# Patient Record
Sex: FEMALE | Race: WHITE | NOT HISPANIC OR LATINO
[De-identification: names, ages, dates, MRNs, and addresses within clinical notes are randomized per-mention and may not be internally consistent; named-entity substitution may affect disease eponyms.]

---

## 2019-11-25 PROBLEM — Z00.00 ENCOUNTER FOR PREVENTIVE HEALTH EXAMINATION: Status: ACTIVE | Noted: 2019-11-25

## 2022-02-28 VITALS — WEIGHT: 158 LBS

## 2022-03-14 DIAGNOSIS — Z78.9 OTHER SPECIFIED HEALTH STATUS: ICD-10-CM

## 2022-03-14 DIAGNOSIS — Z87.898 PERSONAL HISTORY OF OTHER SPECIFIED CONDITIONS: ICD-10-CM

## 2022-03-14 DIAGNOSIS — R87.611 ATYPICAL SQUAMOUS CELLS CANNOT EXCLUDE HIGH GRADE SQUAMOUS INTRAEPITHELIAL LESION ON CYTOLOGIC SMEAR OF CERVIX (ASC-H): ICD-10-CM

## 2022-03-14 DIAGNOSIS — N92.5 OTHER SPECIFIED IRREGULAR MENSTRUATION: ICD-10-CM

## 2022-03-14 DIAGNOSIS — Z80.3 FAMILY HISTORY OF MALIGNANT NEOPLASM OF BREAST: ICD-10-CM

## 2022-03-14 DIAGNOSIS — Z82.49 FAMILY HISTORY OF ISCHEMIC HEART DISEASE AND OTHER DISEASES OF THE CIRCULATORY SYSTEM: ICD-10-CM

## 2022-03-14 PROBLEM — Z00.00 ENCOUNTER FOR PREVENTIVE HEALTH EXAMINATION: Status: ACTIVE | Noted: 2022-03-14

## 2022-03-21 ENCOUNTER — APPOINTMENT (OUTPATIENT)
Dept: OBGYN | Facility: CLINIC | Age: 37
End: 2022-03-21
Payer: COMMERCIAL

## 2022-03-21 VITALS
OXYGEN SATURATION: 98 % | BODY MASS INDEX: 24.85 KG/M2 | HEART RATE: 90 BPM | DIASTOLIC BLOOD PRESSURE: 69 MMHG | TEMPERATURE: 98.2 F | RESPIRATION RATE: 16 BRPM | WEIGHT: 158.31 LBS | HEIGHT: 67 IN | SYSTOLIC BLOOD PRESSURE: 109 MMHG

## 2022-03-21 PROCEDURE — 0502F SUBSEQUENT PRENATAL CARE: CPT

## 2022-04-14 ENCOUNTER — APPOINTMENT (OUTPATIENT)
Dept: ANTEPARTUM | Facility: CLINIC | Age: 37
End: 2022-04-14
Payer: COMMERCIAL

## 2022-04-14 ENCOUNTER — ASOB RESULT (OUTPATIENT)
Age: 37
End: 2022-04-14

## 2022-04-14 PROCEDURE — 76811 OB US DETAILED SNGL FETUS: CPT

## 2022-04-18 ENCOUNTER — APPOINTMENT (OUTPATIENT)
Dept: OBGYN | Facility: CLINIC | Age: 37
End: 2022-04-18
Payer: COMMERCIAL

## 2022-04-18 ENCOUNTER — TRANSCRIPTION ENCOUNTER (OUTPATIENT)
Age: 37
End: 2022-04-18

## 2022-04-18 ENCOUNTER — RESULT CHARGE (OUTPATIENT)
Age: 37
End: 2022-04-18

## 2022-04-18 VITALS
BODY MASS INDEX: 25.11 KG/M2 | TEMPERATURE: 97.7 F | SYSTOLIC BLOOD PRESSURE: 116 MMHG | HEART RATE: 94 BPM | RESPIRATION RATE: 16 BRPM | DIASTOLIC BLOOD PRESSURE: 72 MMHG | OXYGEN SATURATION: 98 % | HEIGHT: 67 IN | WEIGHT: 160 LBS

## 2022-04-18 PROCEDURE — 0502F SUBSEQUENT PRENATAL CARE: CPT

## 2022-05-16 ENCOUNTER — RESULT CHARGE (OUTPATIENT)
Age: 37
End: 2022-05-16

## 2022-05-16 ENCOUNTER — APPOINTMENT (OUTPATIENT)
Dept: OBGYN | Facility: CLINIC | Age: 37
End: 2022-05-16
Payer: COMMERCIAL

## 2022-05-16 VITALS
DIASTOLIC BLOOD PRESSURE: 70 MMHG | RESPIRATION RATE: 16 BRPM | OXYGEN SATURATION: 97 % | TEMPERATURE: 98.3 F | HEIGHT: 67 IN | SYSTOLIC BLOOD PRESSURE: 108 MMHG | WEIGHT: 170 LBS | HEART RATE: 97 BPM | BODY MASS INDEX: 26.68 KG/M2

## 2022-05-16 PROCEDURE — 0502F SUBSEQUENT PRENATAL CARE: CPT

## 2022-06-08 ENCOUNTER — APPOINTMENT (OUTPATIENT)
Dept: OBGYN | Facility: CLINIC | Age: 37
End: 2022-06-08
Payer: COMMERCIAL

## 2022-06-08 ENCOUNTER — LABORATORY RESULT (OUTPATIENT)
Age: 37
End: 2022-06-08

## 2022-06-08 VITALS
RESPIRATION RATE: 17 BRPM | OXYGEN SATURATION: 96 % | BODY MASS INDEX: 27 KG/M2 | SYSTOLIC BLOOD PRESSURE: 108 MMHG | TEMPERATURE: 97.5 F | HEIGHT: 67 IN | HEART RATE: 106 BPM | WEIGHT: 172 LBS | DIASTOLIC BLOOD PRESSURE: 70 MMHG

## 2022-06-08 PROCEDURE — 36415 COLL VENOUS BLD VENIPUNCTURE: CPT

## 2022-06-08 PROCEDURE — 0502F SUBSEQUENT PRENATAL CARE: CPT

## 2022-06-13 ENCOUNTER — NON-APPOINTMENT (OUTPATIENT)
Age: 37
End: 2022-06-13

## 2022-06-13 LAB
APPEARANCE: CLEAR
BASOPHILS # BLD AUTO: 0.02 K/UL
BASOPHILS NFR BLD AUTO: 0.3 %
BILIRUBIN URINE: NEGATIVE
BLOOD URINE: NEGATIVE
COLOR: COLORLESS
EOSINOPHIL # BLD AUTO: 0.08 K/UL
EOSINOPHIL NFR BLD AUTO: 1 %
GLUCOSE 1H P 50 G GLC PO SERPL-MCNC: 134 MG/DL
GLUCOSE QUALITATIVE U: ABNORMAL
HCT VFR BLD CALC: 37.7 %
HGB BLD-MCNC: 11.8 G/DL
IMM GRANULOCYTES NFR BLD AUTO: 1.2 %
KETONES URINE: NEGATIVE
LEUKOCYTE ESTERASE URINE: ABNORMAL
LYMPHOCYTES # BLD AUTO: 1.36 K/UL
LYMPHOCYTES NFR BLD AUTO: 17.7 %
MAN DIFF?: NORMAL
MCHC RBC-ENTMCNC: 31.3 GM/DL
MCHC RBC-ENTMCNC: 31.6 PG
MCV RBC AUTO: 100.8 FL
MONOCYTES # BLD AUTO: 0.48 K/UL
MONOCYTES NFR BLD AUTO: 6.2 %
NEUTROPHILS # BLD AUTO: 5.66 K/UL
NEUTROPHILS NFR BLD AUTO: 73.6 %
NITRITE URINE: NEGATIVE
PH URINE: 6.5
PLATELET # BLD AUTO: 269 K/UL
PROTEIN URINE: NEGATIVE
RBC # BLD: 3.74 M/UL
RBC # FLD: 14.7 %
SPECIFIC GRAVITY URINE: 1
UROBILINOGEN URINE: NORMAL
WBC # FLD AUTO: 7.69 K/UL

## 2022-06-15 RX ORDER — NITROFURANTOIN (MONOHYDRATE/MACROCRYSTALS) 25; 75 MG/1; MG/1
100 CAPSULE ORAL
Qty: 14 | Refills: 0 | Status: ACTIVE | COMMUNITY
Start: 2022-06-15 | End: 1900-01-01

## 2022-06-27 ENCOUNTER — ASOB RESULT (OUTPATIENT)
Age: 37
End: 2022-06-27

## 2022-06-27 ENCOUNTER — APPOINTMENT (OUTPATIENT)
Dept: OBGYN | Facility: CLINIC | Age: 37
End: 2022-06-27
Payer: COMMERCIAL

## 2022-06-27 VITALS
DIASTOLIC BLOOD PRESSURE: 66 MMHG | RESPIRATION RATE: 16 BRPM | HEIGHT: 67 IN | BODY MASS INDEX: 27.47 KG/M2 | TEMPERATURE: 97.2 F | WEIGHT: 175 LBS | SYSTOLIC BLOOD PRESSURE: 102 MMHG | OXYGEN SATURATION: 96 % | HEART RATE: 94 BPM

## 2022-06-27 PROCEDURE — 0502F SUBSEQUENT PRENATAL CARE: CPT

## 2022-06-27 PROCEDURE — 76819 FETAL BIOPHYS PROFIL W/O NST: CPT

## 2022-06-27 PROCEDURE — 76805 OB US >/= 14 WKS SNGL FETUS: CPT

## 2022-06-28 LAB — GLUCOSE 1H P 100 G GLC PO SERPL-MCNC: 170 MG/DL

## 2022-07-08 ENCOUNTER — APPOINTMENT (OUTPATIENT)
Dept: OBGYN | Facility: CLINIC | Age: 37
End: 2022-07-08

## 2022-07-08 VITALS
HEIGHT: 67 IN | OXYGEN SATURATION: 99 % | HEART RATE: 102 BPM | RESPIRATION RATE: 17 BRPM | WEIGHT: 176 LBS | BODY MASS INDEX: 27.62 KG/M2 | DIASTOLIC BLOOD PRESSURE: 74 MMHG | TEMPERATURE: 98.2 F | SYSTOLIC BLOOD PRESSURE: 112 MMHG

## 2022-07-08 LAB
BILIRUB UR QL STRIP: NEGATIVE
CLARITY UR: CLEAR
COLLECTION METHOD: NORMAL
GLUCOSE UR-MCNC: NEGATIVE
HCG UR QL: 0.2 EU/DL
HGB UR QL STRIP.AUTO: NORMAL
KETONES UR-MCNC: NEGATIVE
LEUKOCYTE ESTERASE UR QL STRIP: NORMAL
NITRITE UR QL STRIP: NEGATIVE
PH UR STRIP: 7
PROT UR STRIP-MCNC: NEGATIVE
SP GR UR STRIP: 1.01

## 2022-07-08 PROCEDURE — 0502F SUBSEQUENT PRENATAL CARE: CPT

## 2022-07-08 PROCEDURE — 81002 URINALYSIS NONAUTO W/O SCOPE: CPT

## 2022-07-29 ENCOUNTER — APPOINTMENT (OUTPATIENT)
Dept: OBGYN | Facility: CLINIC | Age: 37
End: 2022-07-29

## 2022-07-29 ENCOUNTER — ASOB RESULT (OUTPATIENT)
Age: 37
End: 2022-07-29

## 2022-07-29 VITALS
RESPIRATION RATE: 17 BRPM | WEIGHT: 180 LBS | OXYGEN SATURATION: 98 % | TEMPERATURE: 97.9 F | DIASTOLIC BLOOD PRESSURE: 72 MMHG | SYSTOLIC BLOOD PRESSURE: 110 MMHG | BODY MASS INDEX: 28.25 KG/M2 | HEIGHT: 67 IN | HEART RATE: 105 BPM

## 2022-07-29 LAB
BILIRUB UR QL STRIP: NEGATIVE
CLARITY UR: CLEAR
COLLECTION METHOD: NORMAL
GLUCOSE UR-MCNC: NEGATIVE
HCG UR QL: 0.2 EU/DL
HGB UR QL STRIP.AUTO: NEGATIVE
KETONES UR-MCNC: NEGATIVE
LEUKOCYTE ESTERASE UR QL STRIP: NORMAL
NITRITE UR QL STRIP: NEGATIVE
PH UR STRIP: 7
PROT UR STRIP-MCNC: NORMAL
SP GR UR STRIP: 1.02

## 2022-07-29 PROCEDURE — 76805 OB US >/= 14 WKS SNGL FETUS: CPT

## 2022-07-29 PROCEDURE — 0502F SUBSEQUENT PRENATAL CARE: CPT

## 2022-07-29 PROCEDURE — 76818 FETAL BIOPHYS PROFILE W/NST: CPT

## 2022-08-01 LAB
GP B STREP DNA SPEC QL NAA+PROBE: NORMAL
GP B STREP DNA SPEC QL NAA+PROBE: NOT DETECTED
SOURCE GBS: NORMAL

## 2022-08-03 ENCOUNTER — NON-APPOINTMENT (OUTPATIENT)
Age: 37
End: 2022-08-03

## 2022-08-04 ENCOUNTER — ASOB RESULT (OUTPATIENT)
Age: 37
End: 2022-08-04

## 2022-08-04 ENCOUNTER — APPOINTMENT (OUTPATIENT)
Dept: OBGYN | Facility: CLINIC | Age: 37
End: 2022-08-04

## 2022-08-04 VITALS
DIASTOLIC BLOOD PRESSURE: 74 MMHG | BODY MASS INDEX: 28.09 KG/M2 | TEMPERATURE: 98.4 F | WEIGHT: 179 LBS | SYSTOLIC BLOOD PRESSURE: 116 MMHG | OXYGEN SATURATION: 95 % | HEART RATE: 89 BPM | RESPIRATION RATE: 17 BRPM | HEIGHT: 67 IN

## 2022-08-04 DIAGNOSIS — Z34.90 ENCOUNTER FOR SUPERVISION OF NORMAL PREGNANCY, UNSPECIFIED, UNSPECIFIED TRIMESTER: ICD-10-CM

## 2022-08-04 LAB
BILIRUB UR QL STRIP: NEGATIVE
CLARITY UR: CLEAR
COLLECTION METHOD: NORMAL
GLUCOSE UR-MCNC: NEGATIVE
HCG UR QL: 0.2 EU/DL
HGB UR QL STRIP.AUTO: NORMAL
KETONES UR-MCNC: NEGATIVE
LEUKOCYTE ESTERASE UR QL STRIP: NORMAL
NITRITE UR QL STRIP: NEGATIVE
PH UR STRIP: 6
PROT UR STRIP-MCNC: NEGATIVE
SP GR UR STRIP: 1.01

## 2022-08-04 PROCEDURE — 76818 FETAL BIOPHYS PROFILE W/NST: CPT

## 2022-08-04 PROCEDURE — 99213 OFFICE O/P EST LOW 20 MIN: CPT | Mod: 25

## 2022-08-04 PROCEDURE — 0502F SUBSEQUENT PRENATAL CARE: CPT

## 2022-08-04 PROCEDURE — 81002 URINALYSIS NONAUTO W/O SCOPE: CPT

## 2022-08-11 ENCOUNTER — TRANSCRIPTION ENCOUNTER (OUTPATIENT)
Age: 37
End: 2022-08-11

## 2022-08-11 ENCOUNTER — INPATIENT (INPATIENT)
Facility: HOSPITAL | Age: 37
LOS: 0 days | Discharge: HOME | End: 2022-08-12
Attending: OBSTETRICS & GYNECOLOGY | Admitting: OBSTETRICS & GYNECOLOGY

## 2022-08-11 VITALS — TEMPERATURE: 98 F

## 2022-08-11 DIAGNOSIS — O42.10 PREMATURE RUPTURE OF MEMBRANES, ONSET OF LABOR MORE THAN 24 HOURS FOLLOWING RUPTURE, UNSPECIFIED WEEKS OF GESTATION: ICD-10-CM

## 2022-08-11 DIAGNOSIS — O26.899 OTHER SPECIFIED PREGNANCY RELATED CONDITIONS, UNSPECIFIED TRIMESTER: ICD-10-CM

## 2022-08-11 DIAGNOSIS — Z90.79 ACQUIRED ABSENCE OF OTHER GENITAL ORGAN(S): Chronic | ICD-10-CM

## 2022-08-11 DIAGNOSIS — Z3A.00 WEEKS OF GESTATION OF PREGNANCY NOT SPECIFIED: ICD-10-CM

## 2022-08-11 LAB
BASOPHILS # BLD AUTO: 0.03 K/UL — SIGNIFICANT CHANGE UP (ref 0–0.2)
BASOPHILS NFR BLD AUTO: 0.2 % — SIGNIFICANT CHANGE UP (ref 0–2)
COVID-19 SPIKE DOMAIN AB INTERP: POSITIVE
COVID-19 SPIKE DOMAIN ANTIBODY RESULT: >250 U/ML — HIGH
EOSINOPHIL # BLD AUTO: 0.07 K/UL — SIGNIFICANT CHANGE UP (ref 0–0.5)
EOSINOPHIL NFR BLD AUTO: 0.6 % — SIGNIFICANT CHANGE UP (ref 0–6)
HCT VFR BLD CALC: 36.9 % — SIGNIFICANT CHANGE UP (ref 34.5–45)
HGB BLD-MCNC: 12.5 G/DL — SIGNIFICANT CHANGE UP (ref 11.5–15.5)
IANC: 9.15 K/UL — HIGH (ref 1.8–7.4)
IMM GRANULOCYTES NFR BLD AUTO: 1.5 % — SIGNIFICANT CHANGE UP (ref 0–1.5)
LYMPHOCYTES # BLD AUTO: 1.86 K/UL — SIGNIFICANT CHANGE UP (ref 1–3.3)
LYMPHOCYTES # BLD AUTO: 15.3 % — SIGNIFICANT CHANGE UP (ref 13–44)
MCHC RBC-ENTMCNC: 31.5 PG — SIGNIFICANT CHANGE UP (ref 27–34)
MCHC RBC-ENTMCNC: 33.9 GM/DL — SIGNIFICANT CHANGE UP (ref 32–36)
MCV RBC AUTO: 92.9 FL — SIGNIFICANT CHANGE UP (ref 80–100)
MONOCYTES # BLD AUTO: 0.84 K/UL — SIGNIFICANT CHANGE UP (ref 0–0.9)
MONOCYTES NFR BLD AUTO: 6.9 % — SIGNIFICANT CHANGE UP (ref 2–14)
NEUTROPHILS # BLD AUTO: 9.15 K/UL — HIGH (ref 1.8–7.4)
NEUTROPHILS NFR BLD AUTO: 75.5 % — SIGNIFICANT CHANGE UP (ref 43–77)
NRBC # BLD: 0 /100 WBCS — SIGNIFICANT CHANGE UP
NRBC # FLD: 0 K/UL — SIGNIFICANT CHANGE UP
PLATELET # BLD AUTO: 266 K/UL — SIGNIFICANT CHANGE UP (ref 150–400)
RBC # BLD: 3.97 M/UL — SIGNIFICANT CHANGE UP (ref 3.8–5.2)
RBC # FLD: 14.6 % — HIGH (ref 10.3–14.5)
SARS-COV-2 IGG+IGM SERPL QL IA: >250 U/ML — HIGH
SARS-COV-2 IGG+IGM SERPL QL IA: POSITIVE
SARS-COV-2 RNA SPEC QL NAA+PROBE: SIGNIFICANT CHANGE UP
T PALLIDUM AB TITR SER: NEGATIVE — SIGNIFICANT CHANGE UP
WBC # BLD: 12.13 K/UL — HIGH (ref 3.8–10.5)
WBC # FLD AUTO: 12.13 K/UL — HIGH (ref 3.8–10.5)

## 2022-08-11 PROCEDURE — 59400 OBSTETRICAL CARE: CPT | Mod: U7,UB,GC

## 2022-08-11 RX ORDER — PRAMOXINE HYDROCHLORIDE 150 MG/15G
1 AEROSOL, FOAM RECTAL EVERY 4 HOURS
Refills: 0 | Status: DISCONTINUED | OUTPATIENT
Start: 2022-08-11 | End: 2022-08-12

## 2022-08-11 RX ORDER — SODIUM CHLORIDE 9 MG/ML
1000 INJECTION, SOLUTION INTRAVENOUS
Refills: 0 | Status: DISCONTINUED | OUTPATIENT
Start: 2022-08-11 | End: 2022-08-11

## 2022-08-11 RX ORDER — AER TRAVELER 0.5 G/1
1 SOLUTION RECTAL; TOPICAL EVERY 4 HOURS
Refills: 0 | Status: DISCONTINUED | OUTPATIENT
Start: 2022-08-11 | End: 2022-08-12

## 2022-08-11 RX ORDER — ACETAMINOPHEN 500 MG
975 TABLET ORAL
Refills: 0 | Status: DISCONTINUED | OUTPATIENT
Start: 2022-08-11 | End: 2022-08-12

## 2022-08-11 RX ORDER — LANOLIN
1 OINTMENT (GRAM) TOPICAL EVERY 6 HOURS
Refills: 0 | Status: DISCONTINUED | OUTPATIENT
Start: 2022-08-11 | End: 2022-08-12

## 2022-08-11 RX ORDER — IBUPROFEN 200 MG
600 TABLET ORAL EVERY 6 HOURS
Refills: 0 | Status: COMPLETED | OUTPATIENT
Start: 2022-08-11 | End: 2023-07-10

## 2022-08-11 RX ORDER — BENZOCAINE 10 %
1 GEL (GRAM) MUCOUS MEMBRANE EVERY 6 HOURS
Refills: 0 | Status: DISCONTINUED | OUTPATIENT
Start: 2022-08-11 | End: 2022-08-12

## 2022-08-11 RX ORDER — SODIUM CHLORIDE 9 MG/ML
3 INJECTION INTRAMUSCULAR; INTRAVENOUS; SUBCUTANEOUS EVERY 8 HOURS
Refills: 0 | Status: DISCONTINUED | OUTPATIENT
Start: 2022-08-11 | End: 2022-08-12

## 2022-08-11 RX ORDER — MAGNESIUM HYDROXIDE 400 MG/1
30 TABLET, CHEWABLE ORAL
Refills: 0 | Status: DISCONTINUED | OUTPATIENT
Start: 2022-08-11 | End: 2022-08-12

## 2022-08-11 RX ORDER — OXYTOCIN 10 UNIT/ML
333.33 VIAL (ML) INJECTION
Qty: 20 | Refills: 0 | Status: DISCONTINUED | OUTPATIENT
Start: 2022-08-11 | End: 2022-08-12

## 2022-08-11 RX ORDER — HYDROCORTISONE 1 %
1 OINTMENT (GRAM) TOPICAL EVERY 6 HOURS
Refills: 0 | Status: DISCONTINUED | OUTPATIENT
Start: 2022-08-11 | End: 2022-08-12

## 2022-08-11 RX ORDER — TETANUS TOXOID, REDUCED DIPHTHERIA TOXOID AND ACELLULAR PERTUSSIS VACCINE, ADSORBED 5; 2.5; 8; 8; 2.5 [IU]/.5ML; [IU]/.5ML; UG/.5ML; UG/.5ML; UG/.5ML
0.5 SUSPENSION INTRAMUSCULAR ONCE
Refills: 0 | Status: COMPLETED | OUTPATIENT
Start: 2022-08-11

## 2022-08-11 RX ORDER — DIBUCAINE 1 %
1 OINTMENT (GRAM) RECTAL EVERY 6 HOURS
Refills: 0 | Status: DISCONTINUED | OUTPATIENT
Start: 2022-08-11 | End: 2022-08-12

## 2022-08-11 RX ORDER — SIMETHICONE 80 MG/1
80 TABLET, CHEWABLE ORAL EVERY 4 HOURS
Refills: 0 | Status: DISCONTINUED | OUTPATIENT
Start: 2022-08-11 | End: 2022-08-12

## 2022-08-11 RX ORDER — OXYCODONE HYDROCHLORIDE 5 MG/1
5 TABLET ORAL ONCE
Refills: 0 | Status: DISCONTINUED | OUTPATIENT
Start: 2022-08-11 | End: 2022-08-12

## 2022-08-11 RX ORDER — KETOROLAC TROMETHAMINE 30 MG/ML
30 SYRINGE (ML) INJECTION ONCE
Refills: 0 | Status: DISCONTINUED | OUTPATIENT
Start: 2022-08-11 | End: 2022-08-12

## 2022-08-11 RX ORDER — CITRIC ACID/SODIUM CITRATE 300-500 MG
15 SOLUTION, ORAL ORAL EVERY 6 HOURS
Refills: 0 | Status: DISCONTINUED | OUTPATIENT
Start: 2022-08-11 | End: 2022-08-11

## 2022-08-11 RX ORDER — CHLORHEXIDINE GLUCONATE 213 G/1000ML
1 SOLUTION TOPICAL ONCE
Refills: 0 | Status: DISCONTINUED | OUTPATIENT
Start: 2022-08-11 | End: 2022-08-11

## 2022-08-11 RX ORDER — DIPHENHYDRAMINE HCL 50 MG
25 CAPSULE ORAL EVERY 6 HOURS
Refills: 0 | Status: DISCONTINUED | OUTPATIENT
Start: 2022-08-11 | End: 2022-08-12

## 2022-08-11 RX ORDER — OXYCODONE HYDROCHLORIDE 5 MG/1
5 TABLET ORAL
Refills: 0 | Status: DISCONTINUED | OUTPATIENT
Start: 2022-08-11 | End: 2022-08-12

## 2022-08-11 RX ADMIN — SODIUM CHLORIDE 3 MILLILITER(S): 9 INJECTION INTRAMUSCULAR; INTRAVENOUS; SUBCUTANEOUS at 22:00

## 2022-08-11 RX ADMIN — Medication 1000 MILLIUNIT(S)/MIN: at 10:14

## 2022-08-11 RX ADMIN — Medication 975 MILLIGRAM(S): at 21:50

## 2022-08-11 RX ADMIN — SODIUM CHLORIDE 3 MILLILITER(S): 9 INJECTION INTRAMUSCULAR; INTRAVENOUS; SUBCUTANEOUS at 14:00

## 2022-08-11 RX ADMIN — SODIUM CHLORIDE 125 MILLILITER(S): 9 INJECTION, SOLUTION INTRAVENOUS at 05:31

## 2022-08-11 RX ADMIN — Medication 975 MILLIGRAM(S): at 20:59

## 2022-08-11 NOTE — OB PROVIDER LABOR PROGRESS NOTE - ASSESSMENT
35yo  @38w4d PROM@2am  - Start Pitocin when covering physician arrives    Isabell Lafleur, PGY1  d/w Dr. Lam
 @ 38w4d IOL for PROM, current category II tracing making cervical change.    -Repositioned to left lateral side  -Oxygen placed via nonrebreather  -Recovery in FHR tracing in noted  -Dr. Lam notified and is en route to [facility]

## 2022-08-11 NOTE — OB PROVIDER H&P - HISTORY OF PRESENT ILLNESS
35y/o  @38.4wks presents with leaking of clear fluid since 2am and mild contractions.   Reports good fetal movement  Denies VB    Allergies: Denies  Medications: PNV, ASA    Medical HX: Denies  Surgical HX: Left Salpingectomy   Denies Etoh/Psy/Drugs/Smoke

## 2022-08-11 NOTE — OB PROVIDER LABOR PROGRESS NOTE - NS_OBIHIFHRDETAILS_OBGYN_ALL_OB_FT
baseline 145, moderate variability, accels present, intermittent variables
140bpm, moderate variability, no accels, variable decels

## 2022-08-11 NOTE — OB NEONATOLOGY/PEDIATRICIAN DELIVERY SUMMARY - NSPEDSNEONOTESA_OBGYN_ALL_OB_FT
Baby is a 38.4wk F born via vacuum VD to a 35yo  A+ mother. Maternal history significant for COVID , ectopic with L salpingectomy in ,  in . PNL nrl/immune/-, GBS neg, COVID pending. ROM 8hrs PTD with clear fluid. Baby emerged with tight nuchal x1, stunned with poor cry initially was w/d/s/s, and started crying and became vigorous at ~1.5MOL. APGARS 8/9. Mom would like to breastfeed. No maternal fever. Baby was transferred to the N.

## 2022-08-11 NOTE — OB PROVIDER DELIVERY SUMMARY - NSSELHIDDEN_OBGYN_ALL_OB_FT
[NS_DeliveryAttending1_OBGYN_ALL_OB_FT:[id number]] [NS_DeliveryAttending1_OBGYN_ALL_OB_FT:[id number]==],[NS_DeliveryAssist1_OBGYN_ALL_OB_FT:[id number]=]

## 2022-08-11 NOTE — OB PROVIDER H&P - NSHPPHYSICALEXAM_GEN_ALL_CORE
Vital Signs Last 24 Hrs  T(C): 36.8 (11 Aug 2022 03:52), Max: 36.8 (11 Aug 2022 03:44)  T(F): 98.2 (11 Aug 2022 03:52), Max: 98.24 (11 Aug 2022 03:44)  HR: 96 (11 Aug 2022 03:52) (96 - 96)  BP: 118/67 (11 Aug 2022 03:52) (118/67 - 118/67)  RR: 14 (11 Aug 2022 03:52) (14 - 14)    Parameters below as of 11 Aug 2022 03:52  Patient On (Oxygen Delivery Method): room air    Assessment reveals VSS  Abdomen soft, NT, gravid  Cat 1 tracing, ctx every 2-7 mins  Transabdominal Ultrasound- vtx  Sterile Speculum- positive ferning, positive pooling   Vaginal Exam- 1/70/-3  A&Ox3  Lungs- clear bilateral  Heart- normal rate and rhythm      PLAN:  Admit for PROM

## 2022-08-11 NOTE — OB RN DELIVERY SUMMARY - NSSELHIDDEN_OBGYN_ALL_OB_FT
[NS_DeliveryAttending1_OBGYN_ALL_OB_FT:[id number]==],[NS_DeliveryAssist1_OBGYN_ALL_OB_FT:[id number]],[NS_DeliveryRN_OBGYN_ALL_OB_FT:[id number]=]

## 2022-08-11 NOTE — OB RN PATIENT PROFILE - FUNCTIONAL ASSESSMENT - BASIC MOBILITY 6.
4-calculated by average/Not able to assess (calculate score using [facility] averaging method)

## 2022-08-11 NOTE — OB PROVIDER DELIVERY SUMMARY - NSVACUUMDELIVERYDETAILSA _OBGYN_ALL_OB_FT
While infant , there was a prolonged deceleration. Pushing was attempted but fetal head did not deliver. Vacuum assisted vaginal delivery of liveborn infant from OA position. Tight nuchal noted. Cord was clamped and cut at the perineum. Shoulder and body delivered easily.

## 2022-08-11 NOTE — OB PROVIDER H&P - NS_OBGYNHISTORY_OBGYN_ALL_OB_FT
GYN: Ectopic x1 in  w/ Left salpingectomy  OB:    11 FT female 8#8 w/ PEC (no Mg)      AP course uncomplicated  -GBS negative

## 2022-08-11 NOTE — OB PROVIDER DELIVERY SUMMARY - NSPROVIDERDELIVERYNOTE_OBGYN_ALL_OB_FT
While infant , there was a prolonged deceleration. Pushing was attempted but fetal head did not deliver. Vacuum assisted vaginal delivery of liveborn infant from OA position. Tight nuchal noted. Cord was clamped and cut at the perineum. Shoulder and body delivered easily. Infant was passed to pediatrics and suctioned. No mec. Placenta delivered intact with a 3 vessel cord. Fundal massage was given and uterine fundus was found to be firm. Vaginal exam revealed an intact cervix, vaginal walls and sulci. Excellent hemostasis was noted. Patient was stable. Count was correct x 2.

## 2022-08-11 NOTE — OB RN TRIAGE NOTE - CURRENT PREGNANCY COMPLICATIONS, OB PROFILE

## 2022-08-11 NOTE — DISCHARGE NOTE OB - CARE PROVIDER_API CALL
Shandra Torres)  Obstetrics and Gynecology  [address] [place] [address]  Phone: [phone]  Fax: [phone]  Follow Up Time:

## 2022-08-11 NOTE — OB RN DELIVERY SUMMARY - NS_SEPSISRSKCALC_OBGYN_ALL_OB_FT
EOS calculated successfully. EOS Risk Factor: 0.5/1000 live births ([place] national incidence); GA=38w4d; Temp=98.24; ROM=8.183; GBS='Negative'; Antibiotics='No antibiotics or any antibiotics < 2 hrs prior to birth'

## 2022-08-11 NOTE — DISCHARGE NOTE OB - NS MD DC FALL RISK RISK
For information on Fall & Injury Prevention, visit: [email or web address] OR  [email or web address] OR  https://www.cdc.gov/steadi/patient.html

## 2022-08-11 NOTE — OB RN PATIENT PROFILE - FALL HARM RISK - UNIVERSAL INTERVENTIONS
Bed in lowest position, wheels locked, appropriate side rails in place/Call bell, personal items and telephone in reach/Instruct patient to call for assistance before getting out of bed or chair/Non-slip footwear when patient is out of bed/[place] to call system/Physically safe environment - no spills, clutter or unnecessary equipment/Purposeful Proactive Rounding/Room/bathroom lighting operational, light cord in reach

## 2022-08-11 NOTE — OB PROVIDER H&P - PROBLEM SELECTOR PLAN 1
Admit for PROM  D/W Dr. Lam  Routine Orders  Oral Cytotec for IOL  Epidural as needed for pain  Covid swabbed and Partner  GBS Negative

## 2022-08-11 NOTE — OB PROVIDER LABOR PROGRESS NOTE - NS_SUBJECTIVE/OBJECTIVE_OBGYN_ALL_OB_FT
Patient seen for category II tracing. Patient comfortable with epidural in place, but reports rectal pressure with contractions.    Vital Signs Last 24 Hrs  T(C): 36.6 (11 Aug 2022 07:08), Max: 36.8 (11 Aug 2022 03:44)  T(F): 97.88 (11 Aug 2022 07:08), Max: 98.24 (11 Aug 2022 03:44)  HR: 75 (11 Aug 2022 09:29) (72 - 96)  BP: 91/52 (11 Aug 2022 09:19) (91/52 - 121/71)  RR: 18 (11 Aug 2022 07:08) (14 - 18)  SpO2: 89% (11 Aug 2022 09:33) (89% - 100%)    Parameters below as of 11 Aug 2022 03:52  Patient On (Oxygen Delivery Method): room air

## 2022-08-12 VITALS
SYSTOLIC BLOOD PRESSURE: 103 MMHG | HEART RATE: 77 BPM | RESPIRATION RATE: 18 BRPM | TEMPERATURE: 98 F | OXYGEN SATURATION: 99 % | DIASTOLIC BLOOD PRESSURE: 56 MMHG

## 2022-08-12 RX ORDER — IBUPROFEN 200 MG
600 TABLET ORAL EVERY 6 HOURS
Refills: 0 | Status: DISCONTINUED | OUTPATIENT
Start: 2022-08-12 | End: 2022-08-12

## 2022-08-12 RX ORDER — ASPIRIN/CALCIUM CARB/MAGNESIUM 324 MG
0 TABLET ORAL
Qty: 0 | Refills: 0 | DISCHARGE

## 2022-08-12 RX ORDER — TETANUS TOXOID, REDUCED DIPHTHERIA TOXOID AND ACELLULAR PERTUSSIS VACCINE, ADSORBED 5; 2.5; 8; 8; 2.5 [IU]/.5ML; [IU]/.5ML; UG/.5ML; UG/.5ML; UG/.5ML
0.5 SUSPENSION INTRAMUSCULAR ONCE
Refills: 0 | Status: COMPLETED | OUTPATIENT
Start: 2022-08-12 | End: 2022-08-12

## 2022-08-12 RX ADMIN — Medication 600 MILLIGRAM(S): at 02:03

## 2022-08-12 RX ADMIN — Medication 600 MILLIGRAM(S): at 03:00

## 2022-08-12 RX ADMIN — SODIUM CHLORIDE 3 MILLILITER(S): 9 INJECTION INTRAMUSCULAR; INTRAVENOUS; SUBCUTANEOUS at 05:53

## 2022-08-12 RX ADMIN — TETANUS TOXOID, REDUCED DIPHTHERIA TOXOID AND ACELLULAR PERTUSSIS VACCINE, ADSORBED 0.5 MILLILITER(S): 5; 2.5; 8; 8; 2.5 SUSPENSION INTRAMUSCULAR at 06:09

## 2022-08-12 RX ADMIN — Medication 600 MILLIGRAM(S): at 13:14

## 2022-08-12 RX ADMIN — Medication 975 MILLIGRAM(S): at 06:39

## 2022-08-12 RX ADMIN — Medication 975 MILLIGRAM(S): at 06:08

## 2022-08-12 NOTE — PROGRESS NOTE ADULT - SUBJECTIVE AND OBJECTIVE BOX
Post partum Day 1      Pt without complaints    Vital Signs Last 24 Hrs  T(C): 36.5 (12 Aug 2022 06:04), Max: 36.8 (11 Aug 2022 18:02)  T(F): 97.7 (12 Aug 2022 06:04), Max: 98.3 (11 Aug 2022 18:02)  HR: 78 (12 Aug 2022 06:04) (72 - 105)  BP: 105/60 (12 Aug 2022 06:04) (91/52 - 163/66)  BP(mean): --  RR: 18 (12 Aug 2022 06:04) (16 - 18)  SpO2: 99% (12 Aug 2022 06:04) (89% - 100%)    Parameters below as of 12 Aug 2022 06:04  Patient On (Oxygen Delivery Method): room air                            12.5   12.13 )-----------( 266      ( 11 Aug 2022 04:55 )             36.9     MEDICATIONS  (STANDING):  acetaminophen     Tablet .. 975 milliGRAM(s) Oral <User Schedule>  ibuprofen  Tablet. 600 milliGRAM(s) Oral every 6 hours  ketorolac   Injectable 30 milliGRAM(s) IV Push once  oxytocin Infusion 333.333 milliUNIT(s)/Min (1000 mL/Hr) IV Continuous <Continuous>  oxytocin Infusion 333.333 milliUNIT(s)/Min (1000 mL/Hr) IV Continuous <Continuous>  prenatal multivitamin 1 Tablet(s) Oral daily  sodium chloride 0.9% lock flush 3 milliLiter(s) IV Push every 8 hours    MEDICATIONS  (PRN):  benzocaine 20%/menthol 0.5% Spray 1 Spray(s) Topical every 6 hours PRN for Perineal discomfort  dibucaine 1% Ointment 1 Application(s) Topical every 6 hours PRN Perineal discomfort  diphenhydrAMINE 25 milliGRAM(s) Oral every 6 hours PRN Pruritus  hydrocortisone 1% Cream 1 Application(s) Topical every 6 hours PRN Moderate Pain (4-6)  lanolin Ointment 1 Application(s) Topical every 6 hours PRN nipple soreness  magnesium hydroxide Suspension 30 milliLiter(s) Oral two times a day PRN Constipation  oxyCODONE    IR 5 milliGRAM(s) Oral every 3 hours PRN Moderate to Severe Pain (4-10)  oxyCODONE    IR 5 milliGRAM(s) Oral once PRN Moderate to Severe Pain (4-10)  pramoxine 1%/zinc 5% Cream 1 Application(s) Topical every 4 hours PRN Moderate Pain (4-6)  simethicone 80 milliGRAM(s) Chew every 4 hours PRN Gas  witch hazel Pads 1 Application(s) Topical every 4 hours PRN Perineal discomfort      Abdomen soft  fundus firm, non tender  extremities non tender    lochia wnl      Patient doing well  Routine post partum care

## 2022-08-12 NOTE — PROGRESS NOTE ADULT - ASSESSMENT
POD #1 s/p vaginal delivery    Patient is doing well. OOBAA. Tolerating clears. Pain is tolerable. No residual anesthetic issues or complications noted.     Ashley Reyes, DNP, CRNA
A/P: 37yo PPD#1 s/p VAVD.  Patient is stable and doing well post-partum.   - Pain well controlled, continue current pain regimen  - Increase ambulation, SCDs when not ambulating  - Continue regular diet    Eric Hickman, PGY1

## 2022-08-12 NOTE — PROGRESS NOTE ADULT - SUBJECTIVE AND OBJECTIVE BOX
S/p MVr on 7/24/20  Managed per primary team  Avoid hypoglycemia  Optimize BG control for surgical wound healing       OB Progress Note:  PPD#1    S: 37yo  PPD#1 s/p VAVD. Patient feels well. Pain is well controlled. She is tolerating a regular diet and passing flatus. She is voiding spontaneously, and ambulating without difficulty. Denies CP/SOB. Denies lightheadedness/dizziness. Denies N/V.    O:  Vitals:  Vital Signs Last 24 Hrs  T(C): 36.5 (12 Aug 2022 06:04), Max: 36.8 (11 Aug 2022 18:02)  T(F): 97.7 (12 Aug 2022 06:04), Max: 98.3 (11 Aug 2022 18:02)  HR: 78 (12 Aug 2022 06:04) (72 - 105)  BP: 105/60 (12 Aug 2022 06:04) (91/52 - 163/66)  RR: 18 (12 Aug 2022 06:04) (16 - 18)  SpO2: 99% (12 Aug 2022 06:04) (89% - 100%)    Parameters below as of 12 Aug 2022 06:04  Patient On (Oxygen Delivery Method): room air    MEDICATIONS  (STANDING):  acetaminophen     Tablet .. 975 milliGRAM(s) Oral <User Schedule>  ibuprofen  Tablet. 600 milliGRAM(s) Oral every 6 hours  ketorolac   Injectable 30 milliGRAM(s) IV Push once  oxytocin Infusion 333.333 milliUNIT(s)/Min (1000 mL/Hr) IV Continuous <Continuous>  oxytocin Infusion 333.333 milliUNIT(s)/Min (1000 mL/Hr) IV Continuous <Continuous>  prenatal multivitamin 1 Tablet(s) Oral daily  sodium chloride 0.9% lock flush 3 milliLiter(s) IV Push every 8 hours    Labs:  Blood type: A Positive  Rubella IgG: RPR: Negative                          12.5   12.13<H> >-----------< 266    (  @ 04:55 )             36.9      Physical Exam:  General: NAD  Abdomen: soft, non-tender, non-distended, fundus firm  Vaginal: Lochia wnl  Extremities: No erythema/edema

## 2022-09-02 NOTE — DISCHARGE NOTE OB - PHYSICIAN SECTION COMPLETE
Chief Complaint   Patient presents with    Other     high potassium, sent by PCP    Fatigue        Patient is a 69 y.o. female who presents with a chief complaint of weakness and. Patient is followed on a regular basis by Dr. Franki Monteiro MD.  Patient with past medical history of diabetes, hypertension, hyperlipidemia, cirrhosis, peripheral vascular disease. Was called to evaluate patient for sudden onset of midsternal chest discomfort/pain. Patient is writhing in pain during the evaluation and moaning. She complains of \"elephant sitting on her chest \"but no associated shortness of breath nausea vomiting or diaphoresis. There is no radiation of the pain. Pain is reproducible with palpation to the anterior chest wall. Cardiac enzymes are very mildly elevated. Patient with new onset atrial fibrillation with rapid ventricular response as well. She is hypotensive with a hemoglobin of 7.7. Patient had paracentesis yesterday as well as today. Patient with a chronic kidney disease creatinine of 1.76 with a GFR of 28. Has sodium of 123 and nephrology is managing. Patient with history of cardiac catheterization 2006 likely the clinic which was negative. Has echo on June 2020 ejection fraction of 85%, severe LVOT obstruction. Past Medical History:   Diagnosis Date    Arthritis     Cardiomyopathy [facility])     mother history.     Chronic kidney disease     Hypertension     Liver disease     Other disorders of kidney and ureter in diseases classified elsewhere     Pneumonia     Psychiatric problem     Sleep apnea, obstructive     Type II or unspecified type diabetes mellitus without mention of complication, not stated as uncontrolled       Patient Active Problem List   Diagnosis    Type 2 diabetes mellitus without complication, without long-term current use of insulin ([facility] [address]    Hypertension    Bell-rectal abscess    Bilateral carotid artery stenosis    Hypertrophic obstructive cardiomyopathy (HOCM) Patient called to convey results of BS log review. Dr. Dukes is advising patient to take 7 units of Lantus nightly and Humalog sliding scale as follows:    Blood Sugar Breakfast Lunch Dinner Bedtime     5 5 5     101-150 8 8 7     151-200 9 9 8     201-250  10 10 9     251-300 11 11 10                  Patient verbalized understanding.    ([address]    HARDEEP (obstructive sleep apnea)    Pseudophakia of both eyes    Regular astigmatism    Cardiomyopathy ([address]    Rectal pain    Abdominal distension    FALLON (acute kidney injury) ([address]    Cirrhosis of liver with ascites (HCC)    Renal mass, left    Alcoholic cirrhosis of liver with ascites (HCC)    Shock ([address]    Arterial hypotension    Subconjunctival hemorrhage of left eye    Right-sided carotid artery disease (HCC)    PVD (posterior vitreous detachment), both eyes    Presbyopia of both eyes    Dry eye syndrome of bilateral lacrimal glands    Other ascites    Hypotension    Cirrhosis, liver, pigmentary (HCC)    Fatigue    Chronic kidney disease    Hyperkalemia    Hyponatremia    Weakness       Past Surgical History:   Procedure Laterality Date    APPENDECTOMY      CHOLECYSTECTOMY      COLONOSCOPY  04/23/2013    CT BIOPSY RENAL  08/04/2020    CT BIOPSY RENAL 8/4/2020 [facility] CT SCAN    CT BIOPSY RENAL  09/04/2020    CT BIOPSY RENAL 9/4/2020 [facility] CT SCAN    FOOT FRACTURE SURGERY Bilateral     FRACTURE SURGERY      HYSTERECTOMY      IR TUNNELED INTRAPERITNL CATH W/IMAG  1/28/2021    IR TUNNELED INTRAPERITNL CATH W/IMAG 1/28/2021 [facility] SPECIAL PROCEDURE    OTHER SURGICAL HISTORY      removal of anal fistulotomy    PARACENTESIS Left 07/28/2020    9015 mls removed by Dr Amanda Marino Left 09/04/2020    04229yd ascites removed by Dr Lashawn Collado 50g albumin given    PARACENTESIS Left 09/15/2020    10,400cc removed by Dr Dung Madison [phone]    PARACENTESIS Left 10/09/2020    7000cc removed by Dr. Amanda Marino Left 10/13/2020    7000ml removed by Dr Dung Madison [phone]    PARACENTESIS Left 10/26/2020    7010cc removed by Dr. Anthony Harris Left 11/06/2020    7020 mls removed ny Dr Amanda Marino Left 11/19/2020    7000 ml removed by Dr. Lashawn Collado.     PARACENTESIS Left 11/30/2020    7000 ml removed by paracentesis by Dr. Amanda Marino Medications   Medication Dose Route Frequency Provider Last Rate Last Admin    midodrine (PROAMATINE) tablet 10 mg  10 mg Oral TID  Yajudi R Quan, DO   10 mg at 03/19/21 1301    conivaptan (VAPRISOL) 20 mg in dextrose 5% 100 mL infusion  20 mg Intravenous Once Olimpia Phillips DO 4.2 mL/hr at 03/19/21 1356 20 mg at 03/19/21 1356    metoprolol (LOPRESSOR) injection 5 mg  5 mg Intravenous Once Rah Alvarenga DO        nitroGLYCERIN (NITROSTAT) SL tablet 0.4 mg  0.4 mg Sublingual Q5 Min PRN Alison Ovalle MD   0.4 mg at 03/19/21 1626    0.9 % sodium chloride infusion   Intravenous Continuous Alison Ovalle MD        dilTIAZem 125 mg in dextrose 5 % 125 mL infusion  5-15 mg/hr Intravenous Continuous Alison Ovalle MD        0.9 % sodium chloride bolus  250 mL Intravenous Once Alison Ovalle  mL/hr at 03/19/21 1628 250 mL at 03/19/21 1628    morphine (PF) injection 2 mg  2 mg Intravenous Q4H PRN Mariano France DO        metoprolol succinate (TOPROL XL) extended release tablet 12.5 mg  12.5 mg Oral BID Olimpia Phillips DO   Stopped at 03/18/21 1939    sodium zirconium cyclosilicate (LOKELMA) oral suspension 10 g  10 g Oral Daily Grabiel Potts MD   10 g at 03/19/21 1301    glucose (GLUTOSE) 40 % oral gel 15 g  15 g Oral PRN Reola Lio, APRN - CNP        dextrose 50 % IV solution  12.5 g Intravenous PRN Reola Lio, APRN - CNP        glucagon (rDNA) injection 1 mg  1 mg Intramuscular PRN Reola Lio, APRN - CNP        dextrose 5 % solution  100 mL/hr Intravenous PRN Reola Lio, APRN - CNP        insulin lispro (HUMALOG) injection vial 0-12 Units  0-12 Units Subcutaneous TID  Ashly Vaca APRN - CNP   2 Units at 03/19/21 1302    insulin lispro (HUMALOG) injection vial 0-6 Units  0-6 Units Subcutaneous Nightly Reola Lio, APRN - CNP   Stopped at 03/18/21 1937    sodium chloride flush 0.9 % injection 10 mL  10 mL Intravenous person, place, and time. She appears well-developed and well-nourished. HENT:   Head: Normocephalic and atraumatic. Eyes: Pupils are equal, round, and reactive to light. Neck: Normal range of motion. Neck supple. No JVD present. No tracheal deviation present. No thyromegaly present. Cardiovascular: Intact distal pulses. An irregularly irregular rhythm present. Tachycardia present. PMI is not displaced. Exam reveals no gallop, no S3, no distant heart sounds and no friction rub. Murmur heard. Pulmonary/Chest: No respiratory distress. She has no wheezes. She has no rales. She exhibits tenderness. Abdominal: Soft. Bowel sounds are normal. She exhibits distension. She exhibits no mass. There is abdominal tenderness. There is rebound and guarding. Musculoskeletal:         General: No edema. Neurological: She is alert and oriented to person, place, and time. No cranial nerve deficit. Skin: Skin is warm and dry. No rash noted. She is not diaphoretic. No erythema. No pallor. Psychiatric: She has a normal mood and affect.  Her behavior is normal. Judgment and thought content normal.       LABS:  Recent Results (from the past 24 hour(s))   POCT Glucose    Collection Time: 03/18/21  7:36 PM   Result Value Ref Range    POC Glucose 149 (H) 60 - 115 mg/dl    Performed on ACCU-CHEK    Comprehensive Metabolic Panel w/ Reflex to MG    Collection Time: 03/19/21  5:58 AM   Result Value Ref Range    Sodium 123 (L) 135 - 144 mEq/L    Potassium reflex Magnesium 3.5 3.4 - 4.9 mEq/L    Chloride 91 (L) 95 - 107 mEq/L    CO2 21 20 - 31 mEq/L    Anion Gap 11 9 - 15 mEq/L    Glucose 124 (H) 70 - 99 mg/dL    BUN 47 (H) 8 - 23 mg/dL    CREATININE 1.76 (H) 0.50 - 0.90 mg/dL    GFR Non-African American 28.3 (L) >60    GFR  34.2 (L) >60    Calcium 7.9 (L) 8.5 - 9.9 mg/dL    Total Protein 4.8 (L) 6.3 - 8.0 g/dL    Albumin 2.6 (L) 3.5 - 4.6 g/dL    Total Bilirubin 0.8 (H) 0.2 - 0.7 mg/dL    Alkaline Phosphatase 97 40 - 130 U/L    ALT 17 0 - 33 U/L    AST 19 0 - 35 U/L    Globulin 2.2 (L) 2.3 - 3.5 g/dL   CBC auto differential    Collection Time: 03/19/21  5:58 AM   Result Value Ref Range    WBC 5.5 4.8 - 10.8 K/uL    RBC 2.65 (L) 4.20 - 5.40 M/uL    Hemoglobin 7.7 (L) 12.0 - 16.0 g/dL    Hematocrit 23.1 (L) 37.0 - 47.0 %    MCV 87.1 82.0 - 100.0 fL    MCH 29.1 27.0 - 31.3 pg    MCHC 33.4 33.0 - 37.0 %    RDW 14.3 11.5 - 14.5 %    Platelets 822 (L) 298 - 400 K/uL    Neutrophils % 76.6 %    Lymphocytes % 9.9 %    Monocytes % 12.0 %    Eosinophils % 1.0 %    Basophils % 0.5 %    Neutrophils Absolute 4.2 1.4 - 6.5 K/uL    Lymphocytes Absolute 0.5 (L) 1.0 - 4.8 K/uL    Monocytes Absolute 0.7 0.2 - 0.8 K/uL    Eosinophils Absolute 0.1 0.0 - 0.7 K/uL    Basophils Absolute 0.0 0.0 - 0.2 K/uL   Comprehensive Metabolic Panel    Collection Time: 03/19/21  5:58 AM   Result Value Ref Range    Potassium 3.5 3.4 - 4.9 mEq/L   Magnesium    Collection Time: 03/19/21  5:58 AM   Result Value Ref Range    Magnesium 1.9 1.7 - 2.4 mg/dL   POCT Glucose    Collection Time: 03/19/21  8:38 AM   Result Value Ref Range    POC Glucose 158 (H) 60 - 115 mg/dl    Performed on ACCU-CHEK    POCT Glucose    Collection Time: 03/19/21 11:43 AM   Result Value Ref Range    POC Glucose 173 (H) 60 - 115 mg/dl    Performed on ACCU-CHEK    EKG 12 Lead    Collection Time: 03/19/21  4:08 PM   Result Value Ref Range    Ventricular Rate 141 BPM    Atrial Rate 107 BPM    QRS Duration 88 ms    Q-T Interval 312 ms    QTc Calculation (Bazett) 477 ms    R Axis 64 degrees    T Axis 214 degrees   Troponin    Collection Time: 03/19/21  4:20 PM   Result Value Ref Range    Troponin 0.028 (HH) 0.000 - 0.010 ng/mL   POCT Glucose    Collection Time: 03/19/21  4:43 PM   Result Value Ref Range    POC Glucose 195 (H) 60 - 115 mg/dl    Performed on ACCU-CHEK      Troponin:   Lab Results   Component Value Date    TROPONINI 0.028 03/19/2021       EKG: atrial fibrillation, ST depressions in the lateral leads as well as T wave version in 1 and aVL. ASSESSMENT:    Active Hospital Problems    Diagnosis Date Noted    Fatigue [R53.83] 03/17/2021     Priority: Low    Weakness [R53.1] 03/17/2021     Priority: Low    Chronic kidney disease [N18.9]      Priority: Low    Hyperkalemia [E87.5]      Priority: Low    Hyponatremia [E87.1]      Priority: Low    Cirrhosis of liver with ascites (HCC) [K74.60, R18.8] 08/15/2020     Priority: Low    Hypertension [I10]      Priority: Low    Type 2 diabetes mellitus without complication, without long-term current use of insulin ([facility] [address] [E11.9] 03/15/2013     Priority: Low     Chest pain questionable secondary to cardiac ischemia versus musculoskeletal as pain is reproducible in the anterior chest wall. Patient with multiple risk factors for CAD    Mildly elevated cardiac enzymes likely secondary to demand ischemia. New onset A. fib with rapid ventricle response    Liver cirrhosis    History of diabetes    Severe anemia    Severe hyponatremia    History of hypertension, currently hypotensive, on midodrine    History of hyperlipidemia    History of tobacco abuse    History of peripheral vascular disease, status post bilateral carotid endarterectomy    Severe LVOT obstruction      PLAN:   1. Transfer to intensive care unit  2. Pain control. Narcotics ordered. 3. Long discussion had with family regarding patient's overall condition as well as her CODE STATUS. Patient is not an ideal PCI candidate at this time secondary to advanced liver cirrhosis and renal disease as well as severe hyponatremia as well as severe anemia. Patient is chronically ill-appearing patient and with poor quality of life. Family agreeable to limit CODE STATUS and consult palliative care. 4. Check serial cardiac enzymes and EKG  5. Monitor on telemetry  6. Control heart rate  7. Poor anticoagulation candidate at this time secondary to severe anemia  8.  Further recommendations to follow Thank you for allowing me to participate in the care of your patient, please don't hesitate to contact me if you have any further questions.     Electronically signed by Anjana Ngo DO on 3/19/2021 at 5:39 PM Yes

## 2022-09-21 ENCOUNTER — APPOINTMENT (OUTPATIENT)
Dept: OBGYN | Facility: CLINIC | Age: 37
End: 2022-09-21

## 2022-09-21 VITALS
DIASTOLIC BLOOD PRESSURE: 64 MMHG | OXYGEN SATURATION: 96 % | BODY MASS INDEX: 23.23 KG/M2 | RESPIRATION RATE: 16 BRPM | SYSTOLIC BLOOD PRESSURE: 98 MMHG | TEMPERATURE: 98.3 F | HEIGHT: 67 IN | WEIGHT: 148 LBS | HEART RATE: 76 BPM

## 2022-09-21 DIAGNOSIS — Z30.019 ENCOUNTER FOR INITIAL PRESCRIPTION OF CONTRACEPTIVES, UNSPECIFIED: ICD-10-CM

## 2022-09-21 PROCEDURE — 0503F POSTPARTUM CARE VISIT: CPT

## 2022-09-21 RX ORDER — NORETHINDRONE 0.35 MG/1
0.35 TABLET ORAL DAILY
Qty: 1 | Refills: 4 | Status: ACTIVE | COMMUNITY
Start: 2022-09-21 | End: 1900-01-01

## 2022-10-14 RX ORDER — NORETHINDRONE 0.35 MG/1
0.35 TABLET ORAL ONCE
Qty: 84 | Refills: 1 | Status: ACTIVE | COMMUNITY
Start: 2022-10-14 | End: 1900-01-01

## 2022-10-14 NOTE — OB PROVIDER DELIVERY SUMMARY - NS_DELIVERYATTENDING1_OBGYN_ALL_OB_FT
Tried calling number on file 3 times and was disconnected. Sent Joustt message in regards to scheduling.   Jacqui Lam MD

## 2023-01-19 ENCOUNTER — APPOINTMENT (OUTPATIENT)
Dept: OBGYN | Facility: CLINIC | Age: 38
End: 2023-01-19
Payer: COMMERCIAL

## 2023-01-19 VITALS
HEIGHT: 67 IN | OXYGEN SATURATION: 96 % | WEIGHT: 137 LBS | BODY MASS INDEX: 21.5 KG/M2 | RESPIRATION RATE: 16 BRPM | HEART RATE: 84 BPM | TEMPERATURE: 98.3 F | DIASTOLIC BLOOD PRESSURE: 68 MMHG | SYSTOLIC BLOOD PRESSURE: 102 MMHG

## 2023-01-19 DIAGNOSIS — Z01.419 ENCOUNTER FOR GYNECOLOGICAL EXAMINATION (GENERAL) (ROUTINE) W/OUT ABNORMAL FINDINGS: ICD-10-CM

## 2023-01-19 PROCEDURE — 99395 PREV VISIT EST AGE 18-39: CPT

## 2023-01-19 NOTE — HISTORY OF PRESENT ILLNESS
[FreeTextEntry1] : BALWINDER PONCE 38 YO, presents for Annual\par G 3  P 2012 - 2 \par 1 Ectopic\par Nursing\par Sexually active, using [employer] Pills\par Medication: [place] Pills \par Mother & MGM has history of breast cancer.\par To do monthly SBE. \par No complaints.

## 2023-01-23 LAB
C TRACH RRNA SPEC QL NAA+PROBE: NOT DETECTED
CYTOLOGY CVX/VAG DOC THIN PREP: NORMAL
N GONORRHOEA RRNA SPEC QL NAA+PROBE: NOT DETECTED
SOURCE TP AMPLIFICATION: NORMAL

## 2023-03-21 ENCOUNTER — RX RENEWAL (OUTPATIENT)
Age: 38
End: 2023-03-21

## 2023-05-07 ENCOUNTER — NON-APPOINTMENT (OUTPATIENT)
Age: 38
End: 2023-05-07

## 2023-07-17 NOTE — OB RN TRIAGE NOTE - [facility]_ALL_OB
Vaginal Leakage Rituxan Pregnancy And Lactation Text: This medication is Pregnancy Category C and it isn't know if it is safe during pregnancy. It is unknown if this medication is excreted in breast milk but similar antibodies are known to be excreted.

## 2023-08-04 ENCOUNTER — RX RENEWAL (OUTPATIENT)
Age: 38
End: 2023-08-04

## 2023-12-18 ENCOUNTER — RX RENEWAL (OUTPATIENT)
Age: 38
End: 2023-12-18

## 2023-12-18 RX ORDER — NORETHINDRONE 0.35 MG/1
0.35 TABLET ORAL
Qty: 84 | Refills: 1 | Status: ACTIVE | COMMUNITY
Start: 2023-03-21 | End: 1900-01-01

## 2024-02-23 ENCOUNTER — APPOINTMENT (OUTPATIENT)
Dept: OBGYN | Facility: CLINIC | Age: 39
End: 2024-02-23

## 2024-03-14 NOTE — OB RN PATIENT PROFILE - FUNCTIONAL ASSESSMENT - DAILY ACTIVITY 3.
Called patient and attempted to schedule DASC procedure. Left detailed voicemail with contact info. Will follow up with this patient in three weeks. First attempt    
This patient was referred to the Direct Access Screening Colonoscopy program (DASC). Per DASC protocol, I reviewed the patient's recent history & physical. This patient meets DASC criteria and the DASC team will contact the patient to schedule their colonoscopy.      
4 = No assist / stand by assistance

## 2024-06-30 ENCOUNTER — NON-APPOINTMENT (OUTPATIENT)
Age: 39
End: 2024-06-30

## 2024-09-15 ENCOUNTER — NON-APPOINTMENT (OUTPATIENT)
Age: 39
End: 2024-09-15

## 2024-10-11 NOTE — DISCHARGE NOTE OB - SEVERE ABDOMINAL/VAGINAL AND/OR RECTAL PAIN
Called and lvm to schedule the pt for an AWV.    Please further assist if pt returns the call.    Thanks  
Statement Selected

## 2024-11-24 NOTE — OB RN PATIENT PROFILE - FUNCTIONAL SCREEN CURRENT LEVEL: SWALLOWING (IF SCORE 2 OR MORE FOR ANY ITEM, CONSULT REHAB SERVICES), MLM)
PROVIDER:[TOKEN:[[id number]],FOLLOWUP:[2 weeks]]
0 = swallows foods/liquids without difficulty

## 2025-03-06 NOTE — OB RN PATIENT PROFILE - NS PRO RUBELLA RECEIVED Y/N
Contacted patient to obtain history prior to scheduling an appointment with Dr. Gore.   Patient explains that she has a history of triple negative right breast cancer for which she had a right mastectomy with Dr. Rodegrs and immediate reconstruction with tissue expander in December of 2008. She had both chemotherapy and radiation therapy to the right breast. She then underwent ultimate reconstruction with a right breast implant. She is insure if this is silicone or saline, and does not have her implant cards. She also explains she had a history of a bilateral breast augmentation in about 1998, and still has the implant in her left (native) breat from then. She has not followed up with plastic surgery, oncology, or radiation oncology. She had not had implant monitoring imaging. She is overdue for her left mammogram.  Patient explains that her right implant is \"rock hard\" and is painful. She believes this is due to radiation damage. This has been occurring for a while, but she did not seek out care sooner as she did not believe she was going to survive her cancer. She would now like to explore options for revision surgery, conversion to autologous reconstruction, or implant removal.   Will schedule patient to be seen. Instructed her to complete her screening mammogram in the meantime. Patient was appreciative of the call.   
no...